# Patient Record
Sex: FEMALE | Race: WHITE
[De-identification: names, ages, dates, MRNs, and addresses within clinical notes are randomized per-mention and may not be internally consistent; named-entity substitution may affect disease eponyms.]

---

## 2020-01-18 ENCOUNTER — HOSPITAL ENCOUNTER (EMERGENCY)
Dept: HOSPITAL 62 - ER | Age: 35
Discharge: HOME | End: 2020-01-18
Payer: OTHER GOVERNMENT

## 2020-01-18 VITALS — DIASTOLIC BLOOD PRESSURE: 58 MMHG | SYSTOLIC BLOOD PRESSURE: 101 MMHG

## 2020-01-18 DIAGNOSIS — R10.30: ICD-10-CM

## 2020-01-18 DIAGNOSIS — R11.2: ICD-10-CM

## 2020-01-18 DIAGNOSIS — K76.89: ICD-10-CM

## 2020-01-18 DIAGNOSIS — Z88.0: ICD-10-CM

## 2020-01-18 DIAGNOSIS — R61: ICD-10-CM

## 2020-01-18 DIAGNOSIS — K56.41: Primary | ICD-10-CM

## 2020-01-18 DIAGNOSIS — K62.4: ICD-10-CM

## 2020-01-18 DIAGNOSIS — R53.1: ICD-10-CM

## 2020-01-18 DIAGNOSIS — R19.7: ICD-10-CM

## 2020-01-18 LAB
ADD MANUAL DIFF: NO
ALBUMIN SERPL-MCNC: 5.1 G/DL (ref 3.5–5)
ALP SERPL-CCNC: 129 U/L (ref 38–126)
ANION GAP SERPL CALC-SCNC: 13 MMOL/L (ref 5–19)
APPEARANCE UR: CLEAR
APTT PPP: YELLOW S
AST SERPL-CCNC: 22 U/L (ref 14–36)
BASOPHILS # BLD AUTO: 0.1 10^3/UL (ref 0–0.2)
BASOPHILS NFR BLD AUTO: 0.8 % (ref 0–2)
BILIRUB DIRECT SERPL-MCNC: 0.3 MG/DL (ref 0–0.4)
BILIRUB SERPL-MCNC: 0.4 MG/DL (ref 0.2–1.3)
BILIRUB UR QL STRIP: NEGATIVE
BUN SERPL-MCNC: 14 MG/DL (ref 7–20)
CALCIUM: 10.1 MG/DL (ref 8.4–10.2)
CHLORIDE SERPL-SCNC: 106 MMOL/L (ref 98–107)
CO2 SERPL-SCNC: 27 MMOL/L (ref 22–30)
EOSINOPHIL # BLD AUTO: 0.1 10^3/UL (ref 0–0.6)
EOSINOPHIL NFR BLD AUTO: 1.2 % (ref 0–6)
ERYTHROCYTE [DISTWIDTH] IN BLOOD BY AUTOMATED COUNT: 14.5 % (ref 11.5–14)
GLUCOSE SERPL-MCNC: 126 MG/DL (ref 75–110)
GLUCOSE UR STRIP-MCNC: NEGATIVE MG/DL
HCT VFR BLD CALC: 39.7 % (ref 36–47)
HGB BLD-MCNC: 13.4 G/DL (ref 12–15.5)
KETONES UR STRIP-MCNC: NEGATIVE MG/DL
LYMPHOCYTES # BLD AUTO: 1.6 10^3/UL (ref 0.5–4.7)
LYMPHOCYTES NFR BLD AUTO: 20.5 % (ref 13–45)
MCH RBC QN AUTO: 28.7 PG (ref 27–33.4)
MCHC RBC AUTO-ENTMCNC: 33.6 G/DL (ref 32–36)
MCV RBC AUTO: 85 FL (ref 80–97)
MONOCYTES # BLD AUTO: 0.3 10^3/UL (ref 0.1–1.4)
MONOCYTES NFR BLD AUTO: 4.3 % (ref 3–13)
NEUTROPHILS # BLD AUTO: 5.6 10^3/UL (ref 1.7–8.2)
NEUTS SEG NFR BLD AUTO: 73.2 % (ref 42–78)
NITRITE UR QL STRIP: NEGATIVE
PH UR STRIP: 6 [PH] (ref 5–9)
PLATELET # BLD: 164 10^3/UL (ref 150–450)
POTASSIUM SERPL-SCNC: 4.5 MMOL/L (ref 3.6–5)
PROT SERPL-MCNC: 8.6 G/DL (ref 6.3–8.2)
PROT UR STRIP-MCNC: NEGATIVE MG/DL
RBC # BLD AUTO: 4.65 10^6/UL (ref 3.72–5.28)
SP GR UR STRIP: 1.02
TOTAL CELLS COUNTED % (AUTO): 100 %
UROBILINOGEN UR-MCNC: NEGATIVE MG/DL (ref ?–2)
WBC # BLD AUTO: 7.6 10^3/UL (ref 4–10.5)

## 2020-01-18 PROCEDURE — 74177 CT ABD & PELVIS W/CONTRAST: CPT

## 2020-01-18 PROCEDURE — 96374 THER/PROPH/DIAG INJ IV PUSH: CPT

## 2020-01-18 PROCEDURE — 81001 URINALYSIS AUTO W/SCOPE: CPT

## 2020-01-18 PROCEDURE — 36415 COLL VENOUS BLD VENIPUNCTURE: CPT

## 2020-01-18 PROCEDURE — 85025 COMPLETE CBC W/AUTO DIFF WBC: CPT

## 2020-01-18 PROCEDURE — 96372 THER/PROPH/DIAG INJ SC/IM: CPT

## 2020-01-18 PROCEDURE — 81025 URINE PREGNANCY TEST: CPT

## 2020-01-18 PROCEDURE — 96361 HYDRATE IV INFUSION ADD-ON: CPT

## 2020-01-18 PROCEDURE — 80053 COMPREHEN METABOLIC PANEL: CPT

## 2020-01-18 PROCEDURE — 99284 EMERGENCY DEPT VISIT MOD MDM: CPT

## 2020-01-18 PROCEDURE — 83690 ASSAY OF LIPASE: CPT

## 2020-01-18 NOTE — RADIOLOGY REPORT (SQ)
CT abdomen and pelvis with contrast on 1/18/2020 5:07 AM 



CLINICAL INDICATION: Lower abdominal pain, cramping, rectal

stricture



TECHNIQUE: Multiple axial images are obtained throughout the

abdomen and pelvis following the administration of IV and oral

contrast. 55 mL of Omnipaque 350 contrast was administered

intravenously. This exam was performed according to our

departmental dose-optimization program, which includes automated

exposure control, adjustment of the mA and/or kV according to

patient size and/or use of iterative reconstruction technique. 

Total DLP is 464.16 mGy*cm.



COMPARISON: None



FINDINGS: 



Abdomen: The lung bases are clear. There is hypervascular lesion

in the hepatic dome measuring approximately 4.9 x 4.7 cm. This is

only visualized on the initial postcontrast imaging and blends in

with surrounding tissue on the delayed imaging. This could

represent a large hemangioma or possibly a hepatic adenoma. Would

recommend nonemergent follow-up liver protocol MRI with contrast

to better evaluate. There is tiny atrophic likely nonfunctioning

right kidney seen on axial image 25 of series 3. 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 The solid

abdominal organs are otherwise unremarkable. There is no

abdominal adenopathy. There is no free fluid or free air within

the abdomen. Most of the colon is not well distended. The

abdominal portion of the GI tract is otherwise unremarkable.



Pelvis: Tampon is noted in the vagina. Pelvic organs appear

unremarkable by CT. No free fluid is noted in the pelvis. Mild

increased stool is noted in the rectum suggesting a mild fecal

impaction without other evidence of significant constipation.

Pelvic portion of the GI tract including the appendix is

otherwise unremarkable. No bony abnormality is noted.



IMPRESSION:

1. Essentially solitary left kidney as above.

2. Mild increased stool in the rectum suggesting a mild fecal

impaction.

3. Right hepatic dome lesion as above, recommend follow-up liver

protocol MRI with and without contrast when feasible.
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
## 2020-11-13 ENCOUNTER — HOSPITAL ENCOUNTER (OUTPATIENT)
Dept: HOSPITAL 62 - END | Age: 35
Discharge: HOME | End: 2020-11-13
Attending: INTERNAL MEDICINE
Payer: OTHER GOVERNMENT

## 2020-11-13 VITALS — SYSTOLIC BLOOD PRESSURE: 102 MMHG | DIASTOLIC BLOOD PRESSURE: 65 MMHG

## 2020-11-13 DIAGNOSIS — K29.50: Primary | ICD-10-CM

## 2020-11-13 DIAGNOSIS — K50.00: ICD-10-CM

## 2020-11-13 DIAGNOSIS — Z03.818: ICD-10-CM

## 2020-11-13 DIAGNOSIS — R93.2: ICD-10-CM

## 2020-11-13 DIAGNOSIS — K59.00: ICD-10-CM

## 2020-11-13 DIAGNOSIS — Q60.0: ICD-10-CM

## 2020-11-13 DIAGNOSIS — K21.9: ICD-10-CM

## 2020-11-13 DIAGNOSIS — Z83.71: ICD-10-CM

## 2020-11-13 PROCEDURE — 88342 IMHCHEM/IMCYTCHM 1ST ANTB: CPT

## 2020-11-13 PROCEDURE — 88305 TISSUE EXAM BY PATHOLOGIST: CPT

## 2020-11-13 PROCEDURE — 43239 EGD BIOPSY SINGLE/MULTIPLE: CPT

## 2020-11-13 PROCEDURE — C9803 HOPD COVID-19 SPEC COLLECT: HCPCS

## 2020-11-13 PROCEDURE — 87635 SARS-COV-2 COVID-19 AMP PRB: CPT

## 2020-11-13 PROCEDURE — 45380 COLONOSCOPY AND BIOPSY: CPT

## 2020-11-13 PROCEDURE — 00813 ANES UPR LWR GI NDSC PX: CPT

## 2020-11-13 NOTE — OPERATIVE REPORT
Operative Report


DATE OF SURGERY: 11/13/20


Operative Report: 





The risk, benefits and alternatives of the procedure including the risk of 

bleeding, perforation requiring surgery have been explained to the patient in 

detail.  Patient is taken back to the endoscopy suite and placed in the left, 

lateral decubital position.  Timeout was called.  Propofol medication is 

administered.  Rectal examination is done which did not reveal any masses, tears

or fissures.  An Olympus videoscope was introduced into the patient's rectum.  

The scope was then carefully advanced all the way to the cecum.  Cecum was 

identified by the usual anatomical landmarks including the ileocecal valve as 

well as the appendiceal office.  Photodocumentation is obtained.  Scope was then

sequentially pulled back via the various segments of the colon including the 

ascending colon, hepatic flexure, transverse colon, splenic flexure, descending 

colon finding to the rectosigmoid portions of the colon.  Retroflexion maneuver 

is performed.


The risks benefits and alternatives of the procedure explained to the patient in

detail and informed consent is obtained.A  GIF Olympus video scope was inserted 

into the patient's mouth and hypopharynx ,the esophagus is identified intubated 

and insufflated, the scope was then advanced through the esophagus stomach and 

duodenum, retroflexion maneuver is done, the esophagus stomach and first and 

second portions of the duodenum examined


PREOPERATIVE DIAGNOSIS: Change in bowel habits.  Gastroesophageal reflux disease


POSTOPERATIVE DIAGNOSIS: Normal colonoscopy.  No obstruction noted.  Random 

biopsies taken in the terminal ileum.  Mild gastritis status post biopsy


OPERATION: Colonoscopy with biopsy.  EGD with biopsy


SURGEON: GEE DE LUNA


ANESTHESIA: LMAC


TISSUE REMOVED OR ALTERED: As noted above.


COMPLICATIONS: 





None.


ESTIMATED BLOOD LOSS: None.


INTRAOPERATIVE FINDINGS: As noted above.


PROCEDURE: 





Patient tolerated the procedure well.


No immediate postprocedure complications are noted.


Patient is discharged in good condition.


Discharge date 11/ 13 2020.


Discharge diet: Regular.


Discharge activity: Regular.


2 to 3-week follow-up to discuss findings.


Patient is instructed call the office or proceed to the emergency room should 

there be any further problems or questions.


5-year surveillance colonoscopy due to the family history of familial polyposis 

syndrome

## 2020-11-16 NOTE — XMS REPORT
Patient Summary Document

                          Created on:2020



Patient:NÉSTOR REDMAN

Sex:Female

:1985

External Reference #:[id number]





Demographics







                          Address                   [address]



                                                    



                                                    [place] [address]

 

                          Home Phone                [phone]

 

                          Work Phone                [phone]

 

                          Email Address             [email or web address]

 

                          Preferred Language        [id number]

 

                          Marital Status            Unknown

 

                          Mormonism Affiliation     Unknown

 

                          Race                      Unknown

 

                          Additional Race(s)        Unavailable

 

                          Ethnic Group              Unknown









Author







                          Organization              [facility]

 

                          Address                   [facility] [address] [place] [id number]

 

                          Phone                     [phone]









Care Team Providers







                    Name                Role                Phone

 

                    Carolyn Ortiz  Attending Clinician Unavailable

 

                    Delores Huitron        Attending Clinician Unavailable









Allergies, Adverse Reactions, Alerts







        Allergy Name Allergy Status  Severity Reaction(s) Onset   Inactive Treat

ing 

Comments



                Type                            Date    Date    Clinician 

 

        AMOXICILLIN Drug    Active  Unknown         2020                 



        TRIHYDRATE allergy                         -                     



                                                00:00:0                 



                                                0                       

 

        CODEINE Drug    Active  Unknown         2020                 



                allergy                         -                     



                                                00:00:0                 



                                                0                       

 

        POTASSIUM Drug    Active  Unknown         2020                 



        CLAVULANATE allergy                         -                     



                                                00:00:0                 



                                                0                       







Medications

This patient has no known medications.



Problems

This patient has no known problems.



Procedures







                Procedure       Date / Time Performed Performing Clinician Devic

e

 

                OFFICE/OUTPATIENT VISIT NEW 2020 08:30:00                 

 

                OFFICE/OUTPATIENT VISIT NEW 2020 13:00:00                 







Results







           Test Description Test Time  Test Comments Text Results Atomic Results

 Result 

Comments









                [facility]  CORONAVIRUS BRANDON PANEL\S\ 2020-11-10 09:26:00           

      









                          Test Item    Value        Reference Range Comments









                [facility] See comment

                     

 

                    [facility]  NOVEL CORONAVIRUS BRANDON (test code = [id number]) N

ot Detected        Not 

Detect                                  







Assessments







                Condition Name  Status          Diagnosis Date  Treating Clinici

an

 

                Body mass index (BMI) 27.0-27.9, adult Active                   

       

 

                Melena          Active                          

 

                Gastro-esophageal reflux disease without Active                 

         



                esophagitis                                     

 

                Family history of colonic polyps Active                         

 

 

                Constipation, unspecified Active                          

 

                Generalized abdominal pain Active                          

 

                Abn findings on dx imaging of abd regions, Active               

           



                inc retroperiton                                 

 

                Encounter for oth general cnsl and advice on Active             

             



                contraception                                   







Encounters







        Start   End     Encounter Admission Attending Care    Care    Encounter



        Date/Time Date/Time Type    Type    Clinicians Facility Department ID

 

        2020 Outpatient         MISA Ortiz   [place] 6

[id number]



        08:30:00 08:30:00                 Carolyn         [id number]

[id number]



                                                        and     [id number]



                                                        [facility] [id number]



                                                        Clini   

 

        2020 Outpatient         [facility] 1A

[id number]



          13:00:00  13:00:00                      Delores Nassar



                                       [id number]



                                                                      

s                                       [id number]



                                                         and    [id number]



                                                        Multispecialty 



                                                        Clinic, 







Social History

This patient has no known social history.



Vital Signs

This patient has no known vital signs.

## 2020-12-04 ENCOUNTER — HOSPITAL ENCOUNTER (EMERGENCY)
Dept: HOSPITAL 62 - ER | Age: 35
Discharge: HOME | End: 2020-12-04
Payer: COMMERCIAL

## 2020-12-04 VITALS — SYSTOLIC BLOOD PRESSURE: 101 MMHG | DIASTOLIC BLOOD PRESSURE: 58 MMHG

## 2020-12-04 DIAGNOSIS — Z88.0: ICD-10-CM

## 2020-12-04 DIAGNOSIS — K52.1: Primary | ICD-10-CM

## 2020-12-04 DIAGNOSIS — T50.995A: ICD-10-CM

## 2020-12-04 DIAGNOSIS — Z91.14: ICD-10-CM

## 2020-12-04 DIAGNOSIS — K59.09: ICD-10-CM

## 2020-12-04 LAB
APPEARANCE UR: CLEAR
APTT PPP: YELLOW S
BILIRUB UR QL STRIP: NEGATIVE
GLUCOSE UR STRIP-MCNC: NEGATIVE MG/DL
KETONES UR STRIP-MCNC: NEGATIVE MG/DL
NITRITE UR QL STRIP: NEGATIVE
PH UR STRIP: 5 [PH] (ref 5–9)
PROT UR STRIP-MCNC: NEGATIVE MG/DL
SP GR UR STRIP: 1.02
UROBILINOGEN UR-MCNC: NEGATIVE MG/DL (ref ?–2)

## 2020-12-04 PROCEDURE — 99283 EMERGENCY DEPT VISIT LOW MDM: CPT

## 2020-12-04 PROCEDURE — S0119 ONDANSETRON 4 MG: HCPCS

## 2020-12-04 PROCEDURE — 81001 URINALYSIS AUTO W/SCOPE: CPT
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
